# Patient Record
Sex: FEMALE | Race: WHITE | NOT HISPANIC OR LATINO | Employment: FULL TIME | URBAN - METROPOLITAN AREA
[De-identification: names, ages, dates, MRNs, and addresses within clinical notes are randomized per-mention and may not be internally consistent; named-entity substitution may affect disease eponyms.]

---

## 2017-03-20 ENCOUNTER — ALLSCRIPTS OFFICE VISIT (OUTPATIENT)
Dept: OTHER | Facility: OTHER | Age: 37
End: 2017-03-20

## 2017-03-20 DIAGNOSIS — N92.0 EXCESSIVE AND FREQUENT MENSTRUATION WITH REGULAR CYCLE: ICD-10-CM

## 2017-03-23 LAB
HPV 18 (HISTORICAL): NOT DETECTED
HPV HIGH RISK 16/18 (HISTORICAL): NOT DETECTED
HPV16 (HISTORICAL): NOT DETECTED
PAP (HISTORICAL): NORMAL

## 2017-03-24 ENCOUNTER — HOSPITAL ENCOUNTER (OUTPATIENT)
Dept: RADIOLOGY | Facility: HOSPITAL | Age: 37
Discharge: HOME | End: 2017-03-24
Attending: OBSTETRICS & GYNECOLOGY
Payer: COMMERCIAL

## 2017-03-24 DIAGNOSIS — N92.0 EXCESSIVE AND FREQUENT MENSTRUATION WITH REGULAR CYCLE: ICD-10-CM

## 2017-03-24 PROCEDURE — 76830 TRANSVAGINAL US NON-OB: CPT

## 2017-03-24 PROCEDURE — 76856 US EXAM PELVIC COMPLETE: CPT

## 2017-03-26 LAB — ENDOMETRIAL BIOPSY (HISTORICAL): NORMAL

## 2017-04-03 ENCOUNTER — ALLSCRIPTS OFFICE VISIT (OUTPATIENT)
Dept: OTHER | Facility: OTHER | Age: 37
End: 2017-04-03

## 2017-04-07 ENCOUNTER — LAB CONVERSION - ENCOUNTER (OUTPATIENT)
Dept: OTHER | Facility: OTHER | Age: 37
End: 2017-04-07

## 2017-04-07 LAB — ENDOMETRIAL BIOPSY (HISTORICAL): NORMAL

## 2017-05-26 ENCOUNTER — ALLSCRIPTS OFFICE VISIT (OUTPATIENT)
Dept: OTHER | Facility: OTHER | Age: 37
End: 2017-05-26

## 2017-05-26 DIAGNOSIS — N64.9 DISORDER OF BREAST: ICD-10-CM

## 2017-05-26 DIAGNOSIS — N63.0 BREAST LUMP: ICD-10-CM

## 2017-05-30 ENCOUNTER — ALLSCRIPTS OFFICE VISIT (OUTPATIENT)
Dept: OTHER | Facility: OTHER | Age: 37
End: 2017-05-30

## 2017-06-01 ENCOUNTER — HOSPITAL ENCOUNTER (OUTPATIENT)
Dept: MAMMOGRAPHY | Facility: CLINIC | Age: 37
Discharge: HOME | End: 2017-06-01
Payer: COMMERCIAL

## 2017-06-01 ENCOUNTER — HOSPITAL ENCOUNTER (OUTPATIENT)
Dept: ULTRASOUND IMAGING | Facility: CLINIC | Age: 37
Discharge: HOME | End: 2017-06-01
Payer: COMMERCIAL

## 2017-06-01 DIAGNOSIS — N64.9 DISORDER OF BREAST: ICD-10-CM

## 2017-06-01 DIAGNOSIS — N63.0 BREAST LUMP: ICD-10-CM

## 2017-06-01 PROCEDURE — G0204 DX MAMMO INCL CAD BI: HCPCS

## 2017-06-01 PROCEDURE — 76642 ULTRASOUND BREAST LIMITED: CPT

## 2017-06-02 ENCOUNTER — GENERIC CONVERSION - ENCOUNTER (OUTPATIENT)
Dept: OTHER | Facility: OTHER | Age: 37
End: 2017-06-02

## 2018-01-12 NOTE — MISCELLANEOUS
Message   Recorded as Task   Date: 05/26/2017 12:05 PM, Created By: Torsten Scott   Task Name: Care Coordination   Assigned To: Ruy Valdez   Regarding Patient: Oc Lucero, Status: Active   Comment:    JaimeTammie - 26 May 2017 12:05 PM     TASK CREATED  apt sched  05/30 with anaid for breast check    found lump        Active Problems    1  Breast disorder (611 9) (N64 9)   2  Dysmenorrhea (625 3) (N94 6)   3  Encounter for gynecological examination without abnormal finding (V72 31) (Z01 419)   4  Encounter for routine gynecological examination (V72 31) (Z01 419)   5  Encounter for screening for human papillomavirus (hpv) (V73 81) (Z11 51)   6  Menorrhagia (626 2) (N92 0)    Current Meds   1  TraZODone HCl - 100 MG Oral Tablet; Therapy: (Recorded:17Nov2014) to Recorded   2  Venlafaxine HCl - 75 MG Oral Tablet; Therapy: (Recorded:17Nov2014) to Recorded   3  Xanax TABS (ALPRAZolam); Therapy: (Recorded:09Mar2016) to Recorded   4  Zonisamide 100 MG Oral Capsule; Therapy: (Recorded:17Nov2014) to Recorded    Allergies    1  Augmentin TABS   2  Dilantin CAPS   3  Sulfa Drugs   4   TEGretol TABS    Signatures   Electronically signed by : Tomasa Stephenson, ; May 26 2017 12:05PM EST                       (Author)

## 2018-01-12 NOTE — MISCELLANEOUS
Message   Recorded as Task   Date: 06/02/2017 12:04 PM, Created By: Joan Cunha   Task Name: Result Follow Up   Assigned To: Edi Diane GYN,Team   Regarding Patient: Bella Preston, Status: In Progress   CommentRaquel Caballero - 02 Jun 2017 12:04 PM     TASK CREATED  Normal findings on diagnostic imaging  Please notify patient and advise that she schedules follow up exam with me in about 2 months for recheck if the area of concern    Thanks   Marycruz West - 02 Jun 2017 12:41 PM     TASK IN [address] - 02 Jun 2017 12:51 PM     TASK EDITED  Pt notified with normal diagnostic findings  She will need a f/u breast check in 2 months which she was unable to schedule at this time  She will call back to schedule  Active Problems    1  Breast disorder (611 9) (N64 9)   2  Breast mass, right (611 72) (N63)   3  Dysmenorrhea (625 3) (N94 6)   4  Menorrhagia (626 2) (N92 0)   5  Skin lesion of breast (611 9) (N64 9)    Current Meds   1  Clotrimazole 1 % External Cream; APPLY SPARINGLY TO AFFECTED AREA(S) TWICE   DAILY; Therapy: [id number] to (Evaluate:23Jun2017)  Requested for: [id number]; Last   Rx:26May2017 Ordered   2  TraZODone HCl - 100 MG Oral Tablet; Therapy: (Recorded:17Nov2014) to Recorded   3  Venlafaxine HCl - 75 MG Oral Tablet; Therapy: (Recorded:17Nov2014) to Recorded   4  Xanax TABS (ALPRAZolam); Therapy: (Recorded:09Mar2016) to Recorded   5  Zonisamide 100 MG Oral Capsule; Therapy: (Recorded:17Nov2014) to Recorded    Allergies    1  Augmentin TABS   2  Dilantin CAPS   3  Sulfa Drugs   4   TEGretol TABS    Signatures   Electronically signed by : Rosalie Anderson, ; Jun 2 2017 12:52PM EST                       (Author)

## 2018-01-13 VITALS
DIASTOLIC BLOOD PRESSURE: 80 MMHG | WEIGHT: 176 LBS | BODY MASS INDEX: 30.05 KG/M2 | HEIGHT: 64 IN | SYSTOLIC BLOOD PRESSURE: 132 MMHG

## 2018-01-14 VITALS
DIASTOLIC BLOOD PRESSURE: 80 MMHG | WEIGHT: 175 LBS | SYSTOLIC BLOOD PRESSURE: 122 MMHG | HEIGHT: 64 IN | BODY MASS INDEX: 29.88 KG/M2

## 2018-04-08 PROBLEM — L98.8 SKIN LESION OF BREAST: Status: ACTIVE | Noted: 2017-05-26

## 2018-04-08 PROBLEM — N63.10 BREAST MASS, RIGHT: Status: ACTIVE | Noted: 2017-05-26

## 2020-09-15 ENCOUNTER — APPOINTMENT (EMERGENCY)
Dept: RADIOLOGY | Facility: HOSPITAL | Age: 40
End: 2020-09-15
Payer: COMMERCIAL

## 2020-09-15 ENCOUNTER — HOSPITAL ENCOUNTER (OUTPATIENT)
Facility: HOSPITAL | Age: 40
Setting detail: OBSERVATION
Discharge: HOME | End: 2020-09-16
Attending: EMERGENCY MEDICINE | Admitting: INTERNAL MEDICINE
Payer: COMMERCIAL

## 2020-09-15 DIAGNOSIS — R10.12 LEFT UPPER QUADRANT ABDOMINAL PAIN: ICD-10-CM

## 2020-09-15 DIAGNOSIS — D73.4 SPLENIC CYST: Primary | ICD-10-CM

## 2020-09-15 LAB
ALBUMIN SERPL BCP-MCNC: 3.9 G/DL (ref 3.5–5)
ALP SERPL-CCNC: 92 U/L (ref 46–116)
ALT SERPL W P-5'-P-CCNC: 18 U/L (ref 12–78)
ANION GAP SERPL CALCULATED.3IONS-SCNC: 6 MMOL/L (ref 4–13)
AST SERPL W P-5'-P-CCNC: 7 U/L (ref 5–45)
BASOPHILS # BLD AUTO: 0.05 THOUSANDS/ΜL (ref 0–0.1)
BASOPHILS NFR BLD AUTO: 0 % (ref 0–1)
BILIRUB SERPL-MCNC: 0.28 MG/DL (ref 0.2–1)
BILIRUB UR QL STRIP: NEGATIVE
BUN SERPL-MCNC: 11 MG/DL (ref 5–25)
CALCIUM SERPL-MCNC: 9 MG/DL (ref 8.3–10.1)
CHLORIDE SERPL-SCNC: 107 MMOL/L (ref 100–108)
CLARITY UR: CLEAR
CO2 SERPL-SCNC: 24 MMOL/L (ref 21–32)
COLOR UR: YELLOW
COLOR, POC: YELLOW
CREAT SERPL-MCNC: 0.75 MG/DL (ref 0.6–1.3)
EOSINOPHIL # BLD AUTO: 0.17 THOUSAND/ΜL (ref 0–0.61)
EOSINOPHIL NFR BLD AUTO: 1 % (ref 0–6)
ERYTHROCYTE [DISTWIDTH] IN BLOOD BY AUTOMATED COUNT: 12.9 % (ref 11.6–15.1)
EXT PREG TEST URINE: NEGATIVE
EXT. CONTROL ED NAV: NORMAL
GFR SERPL CREATININE-BSD FRML MDRD: 99 ML/MIN/1.73SQ M
GLUCOSE SERPL-MCNC: 94 MG/DL (ref 65–140)
GLUCOSE UR STRIP-MCNC: NEGATIVE MG/DL
HCT VFR BLD AUTO: 43.3 % (ref 34.8–46.1)
HGB BLD-MCNC: 14.4 G/DL (ref 11.5–15.4)
HGB UR QL STRIP.AUTO: NEGATIVE
IMM GRANULOCYTES # BLD AUTO: 0.08 THOUSAND/UL (ref 0–0.2)
IMM GRANULOCYTES NFR BLD AUTO: 1 % (ref 0–2)
KETONES UR STRIP-MCNC: NEGATIVE MG/DL
LEUKOCYTE ESTERASE UR QL STRIP: NEGATIVE
LIPASE SERPL-CCNC: 64 U/L (ref 73–393)
LYMPHOCYTES # BLD AUTO: 2.99 THOUSANDS/ΜL (ref 0.6–4.47)
LYMPHOCYTES NFR BLD AUTO: 20 % (ref 14–44)
MCH RBC QN AUTO: 31 PG (ref 26.8–34.3)
MCHC RBC AUTO-ENTMCNC: 33.3 G/DL (ref 31.4–37.4)
MCV RBC AUTO: 93 FL (ref 82–98)
MONOCYTES # BLD AUTO: 1 THOUSAND/ΜL (ref 0.17–1.22)
MONOCYTES NFR BLD AUTO: 7 % (ref 4–12)
NEUTROPHILS # BLD AUTO: 10.62 THOUSANDS/ΜL (ref 1.85–7.62)
NEUTS SEG NFR BLD AUTO: 71 % (ref 43–75)
NITRITE UR QL STRIP: NEGATIVE
NRBC BLD AUTO-RTO: 0 /100 WBCS
PH UR STRIP.AUTO: 7.5 [PH] (ref 4.5–8)
PLATELET # BLD AUTO: 273 THOUSANDS/UL (ref 149–390)
PMV BLD AUTO: 9.4 FL (ref 8.9–12.7)
POTASSIUM SERPL-SCNC: 3.5 MMOL/L (ref 3.5–5.3)
PROT SERPL-MCNC: 7.1 G/DL (ref 6.4–8.2)
PROT UR STRIP-MCNC: NEGATIVE MG/DL
RBC # BLD AUTO: 4.65 MILLION/UL (ref 3.81–5.12)
SODIUM SERPL-SCNC: 137 MMOL/L (ref 136–145)
SP GR UR STRIP.AUTO: 1.01 (ref 1–1.03)
TROPONIN I SERPL-MCNC: <0.02 NG/ML
UROBILINOGEN UR QL STRIP.AUTO: 0.2 E.U./DL
WBC # BLD AUTO: 14.91 THOUSAND/UL (ref 4.31–10.16)

## 2020-09-15 PROCEDURE — 80053 COMPREHEN METABOLIC PANEL: CPT | Performed by: EMERGENCY MEDICINE

## 2020-09-15 PROCEDURE — 81025 URINE PREGNANCY TEST: CPT | Performed by: EMERGENCY MEDICINE

## 2020-09-15 PROCEDURE — 71275 CT ANGIOGRAPHY CHEST: CPT

## 2020-09-15 PROCEDURE — 99285 EMERGENCY DEPT VISIT HI MDM: CPT | Performed by: EMERGENCY MEDICINE

## 2020-09-15 PROCEDURE — 36415 COLL VENOUS BLD VENIPUNCTURE: CPT | Performed by: EMERGENCY MEDICINE

## 2020-09-15 PROCEDURE — 74174 CTA ABD&PLVS W/CONTRAST: CPT

## 2020-09-15 PROCEDURE — G1004 CDSM NDSC: HCPCS

## 2020-09-15 PROCEDURE — 99285 EMERGENCY DEPT VISIT HI MDM: CPT

## 2020-09-15 PROCEDURE — 93005 ELECTROCARDIOGRAM TRACING: CPT

## 2020-09-15 PROCEDURE — 96376 TX/PRO/DX INJ SAME DRUG ADON: CPT

## 2020-09-15 PROCEDURE — 84484 ASSAY OF TROPONIN QUANT: CPT | Performed by: EMERGENCY MEDICINE

## 2020-09-15 PROCEDURE — 83690 ASSAY OF LIPASE: CPT | Performed by: EMERGENCY MEDICINE

## 2020-09-15 PROCEDURE — 81003 URINALYSIS AUTO W/O SCOPE: CPT

## 2020-09-15 PROCEDURE — 96375 TX/PRO/DX INJ NEW DRUG ADDON: CPT

## 2020-09-15 PROCEDURE — 85025 COMPLETE CBC W/AUTO DIFF WBC: CPT | Performed by: EMERGENCY MEDICINE

## 2020-09-15 PROCEDURE — 96374 THER/PROPH/DIAG INJ IV PUSH: CPT

## 2020-09-15 RX ORDER — ZONISAMIDE 100 MG/1
100 CAPSULE ORAL 2 TIMES DAILY
COMMUNITY
Start: 2020-09-08

## 2020-09-15 RX ORDER — FENTANYL CITRATE 50 UG/ML
50 INJECTION, SOLUTION INTRAMUSCULAR; INTRAVENOUS ONCE
Status: COMPLETED | OUTPATIENT
Start: 2020-09-15 | End: 2020-09-15

## 2020-09-15 RX ORDER — ACETAMINOPHEN 325 MG/1
975 TABLET ORAL ONCE
Status: COMPLETED | OUTPATIENT
Start: 2020-09-15 | End: 2020-09-15

## 2020-09-15 RX ORDER — VENLAFAXINE HYDROCHLORIDE 37.5 MG/1
187.5 CAPSULE, EXTENDED RELEASE ORAL DAILY
COMMUNITY
Start: 2020-08-06

## 2020-09-15 RX ORDER — KETOROLAC TROMETHAMINE 30 MG/ML
15 INJECTION, SOLUTION INTRAMUSCULAR; INTRAVENOUS ONCE
Status: COMPLETED | OUTPATIENT
Start: 2020-09-15 | End: 2020-09-15

## 2020-09-15 RX ORDER — ONDANSETRON 2 MG/ML
4 INJECTION INTRAMUSCULAR; INTRAVENOUS ONCE
Status: COMPLETED | OUTPATIENT
Start: 2020-09-15 | End: 2020-09-15

## 2020-09-15 RX ORDER — QUETIAPINE FUMARATE 100 MG/1
50 TABLET, FILM COATED ORAL
COMMUNITY
Start: 2020-08-06 | End: 2021-08-06

## 2020-09-15 RX ADMIN — KETOROLAC TROMETHAMINE 15 MG: 30 INJECTION, SOLUTION INTRAMUSCULAR at 20:42

## 2020-09-15 RX ADMIN — ACETAMINOPHEN 975 MG: 325 TABLET, FILM COATED ORAL at 20:34

## 2020-09-15 RX ADMIN — FENTANYL CITRATE 50 MCG: 50 INJECTION INTRAMUSCULAR; INTRAVENOUS at 21:01

## 2020-09-15 RX ADMIN — IOHEXOL 100 ML: 350 INJECTION, SOLUTION INTRAVENOUS at 23:08

## 2020-09-15 RX ADMIN — FENTANYL CITRATE 50 MCG: 50 INJECTION, SOLUTION INTRAMUSCULAR; INTRAVENOUS at 23:28

## 2020-09-15 RX ADMIN — ONDANSETRON 4 MG: 2 INJECTION INTRAMUSCULAR; INTRAVENOUS at 20:36

## 2020-09-15 NOTE — ED PROVIDER NOTES
History  Chief Complaint   Patient presents with    Abdominal Pain     left sided pain under ribs that radiates to mid abdomen, lower back, and up into left side of neck beginning yesterday  pt took advil and gasex with little relief     41 YOF with history of depression, anxiety, seizures who presents for abdominal and chest pain  Patient reports her symptoms started yesterday morning with LUQ and L flank pain  Last night, she noted her pain began to radiate to the L shoulder and upper back  Her pain is pleuritic and worse with movement  Just prior to arrival, she began to feel nauseous and "clammy " She has never had anything like this before  Her symptoms have not been progressively worsening, however, they have not improved at all prompting her to seek evaluation  She denies vomiting, diarrhea, dysuria, SOB, cough, fevers  She has not had any abdominal surgeries  She denies surgeries in the past 6 months, recent travel, estrogen-containing medications, history of DVT/PE  Prior to Admission Medications   Prescriptions Last Dose Informant Patient Reported? Taking? QUEtiapine (SEROquel) 100 mg tablet   Yes Yes   Sig: Take 50 mg by mouth   venlafaxine (EFFEXOR-XR) 37 5 mg 24 hr capsule   Yes Yes   Sig: Take 187 5 mg by mouth daily   zonisamide (ZONEGRAN) 100 mg capsule   Yes Yes   Sig: Take 100 mg by mouth 2 (two) times a day      Facility-Administered Medications: None       Past Medical History:   Diagnosis Date    AVM (arteriovenous malformation)     Candidal vulvovaginitis     Depression     Headache     Normal delivery     2003 daughter, 2007 son    Ovarian cyst     RESOLVED: [id number]    Pelvic pain     Seizure ([address]     Seizures ([facility] [address] )        Past Surgical History:   Procedure Laterality Date    BREAST SURGERY      ENLARGEMENT    COMBINED AUGMENTATION MAMMAPLASTY AND ABDOMINOPLASTY      with expander    CRANIOTOMY      right frontal lobe in 1999;  A-V MALFORMATION REPAIR    ENDOMETRIAL ABLATION      DONNIE; RESOLVED: [id number]    ENDOMETRIAL BIOPSY  03/20/2017    EB; WITHOUT CERVICAL DILATION    HYSTEROSCOPY W/ ENDOMETRIAL ABLATION      LAMINECTOMY      L4-L5; LUMBAR       Family History   Problem Relation Age of Onset    Arthritis Mother     Carpal tunnel syndrome Mother     Obesity Mother     Hyperlipidemia Father     Hypertension Father      I have reviewed and agree with the history as documented  E-Cigarette/Vaping     E-Cigarette/Vaping Substances     Social History     Tobacco Use    Smoking status: Current Every Day Smoker     Packs/day: 0 50    Smokeless tobacco: Never Used   Substance Use Topics    Alcohol use: Yes     Comment: DRINKS BEER AS PER ALL SCRIPTS ; SOCIAL     Drug use: Yes     Types: Marijuana     Comment: medical        Review of Systems   Constitutional: Positive for diaphoresis  Negative for chills and fever  HENT: Negative for congestion, rhinorrhea and sore throat  Eyes: Negative for visual disturbance  Respiratory: Negative for cough, chest tightness and shortness of breath  Cardiovascular: Positive for chest pain  Negative for leg swelling  Gastrointestinal: Positive for abdominal pain and nausea  Negative for abdominal distention, blood in stool, constipation, diarrhea and vomiting  Genitourinary: Positive for flank pain  Negative for dysuria, frequency, hematuria and urgency  Musculoskeletal: Negative for back pain and gait problem  Skin: Negative for pallor and rash  Neurological: Negative for syncope, weakness, light-headedness and headaches  All other systems reviewed and are negative        Physical Exam  ED Triage Vitals [09/15/20 1856]   Temperature Pulse Respirations Blood Pressure SpO2   98 1 °F (36 7 °C) 72 18 136/84 97 %      Temp Source Heart Rate Source Patient Position - Orthostatic VS BP Location FiO2 (%)   Oral Monitor Sitting Left arm --      Pain Score       7             Orthostatic Vital Signs  Vitals: 09/16/20 0845 09/16/20 0900 09/16/20 1000 09/16/20 1032   BP: 136/63 118/60 117/58 134/60   Pulse: 76 64 56 62   Patient Position - Orthostatic VS: Lying   Lying       Physical Exam  Vitals signs and nursing note reviewed  Constitutional:       General: She is not in acute distress  Appearance: She is well-developed  HENT:      Head: Normocephalic and atraumatic  Nose: Nose normal       Mouth/Throat:      Mouth: Mucous membranes are moist    Eyes:      General: No scleral icterus  Extraocular Movements: Extraocular movements intact  Conjunctiva/sclera: Conjunctivae normal       Pupils: Pupils are equal, round, and reactive to light  Neck:      Musculoskeletal: Normal range of motion and neck supple  Cardiovascular:      Rate and Rhythm: Normal rate and regular rhythm  Heart sounds: No murmur  No friction rub  No gallop  Pulmonary:      Effort: Pulmonary effort is normal       Breath sounds: Normal breath sounds  No wheezing or rales  Chest:      Chest wall: Tenderness (left upper) present  Abdominal:      General: Bowel sounds are normal  There is no distension  Palpations: Abdomen is soft  Tenderness: There is abdominal tenderness in the epigastric area and left upper quadrant  There is no right CVA tenderness or left CVA tenderness  Musculoskeletal: Normal range of motion  General: No swelling or tenderness  Comments: No calf tenderness or lower extremity edema  Skin:     General: Skin is warm and dry  Coloration: Skin is not pale  Findings: No rash  Neurological:      Mental Status: She is alert and oriented to person, place, and time     Psychiatric:         Behavior: Behavior normal          ED Medications  Medications   ondansetron (ZOFRAN) injection 4 mg (4 mg Intravenous Given 9/15/20 2036)   ketorolac (TORADOL) injection 15 mg (15 mg Intravenous Given 9/15/20 2042)   acetaminophen (TYLENOL) tablet 975 mg (975 mg Oral Given 9/15/20 2034)   fentanyl citrate (PF) 100 MCG/2ML 50 mcg (50 mcg Intravenous Given 9/15/20 2101)   fentanyl citrate (PF) 100 MCG/2ML 50 mcg (50 mcg Intravenous Given 9/15/20 2328)   iohexol (OMNIPAQUE) 350 MG/ML injection (MULTI-DOSE) 100 mL (100 mL Intravenous Given 9/15/20 2308)   HYDROmorphone (DILAUDID) injection 0 5 mg (0 5 mg Intravenous Given 9/16/20 0115)   HYDROmorphone (DILAUDID) injection 0 5 mg (0 5 mg Intravenous Given 9/16/20 0431)       Diagnostic Studies  Results Reviewed     Procedure Component Value Units Date/Time    Troponin I [[id number]] Collected:  09/16/20 0839    Lab Status:  Final result Specimen:  Blood from Arm, Left Updated:  09/16/20 0924     Troponin I <0 02 ng/mL     Troponin I [[id number]] Collected:  09/16/20 3982    Lab Status:  Final result Specimen:  Blood from Arm, Left Updated:  09/16/20 0708     Troponin I <0 02 ng/mL     Comprehensive metabolic panel [[id number]] Collected:  09/15/20 2030    Lab Status:  Final result Specimen:  Blood from Arm, Left Updated:  09/15/20 2107     Sodium 137 mmol/L      Potassium 3 5 mmol/L      Chloride 107 mmol/L      CO2 24 mmol/L      ANION GAP 6 mmol/L      BUN 11 mg/dL      Creatinine 0 75 mg/dL      Glucose 94 mg/dL      Calcium 9 0 mg/dL      AST 7 U/L      ALT 18 U/L      Alkaline Phosphatase 92 U/L      Total Protein 7 1 g/dL      Albumin 3 9 g/dL      Total Bilirubin 0 28 mg/dL      eGFR 99 ml/min/1 73sq m     Narrative:       Frank guidelines for Chronic Kidney Disease (CKD):     Stage 1 with normal or high GFR (GFR > 90 mL/min/1 73 square meters)    Stage 2 Mild CKD (GFR = 60-89 mL/min/1 73 square meters)    Stage 3A Moderate CKD (GFR = 45-59 mL/min/1 73 square meters)    Stage 3B Moderate CKD (GFR = 30-44 mL/min/1 73 square meters)    Stage 4 Severe CKD (GFR = 15-29 mL/min/1 73 square meters)    Stage 5 End Stage CKD (GFR <15 mL/min/1 73 square meters)  Note: GFR calculation is accurate only with a steady state creatinine    Lipase [[id number]]  (Abnormal) Collected:  09/15/20 2030    Lab Status:  Final result Specimen:  Blood from Arm, Left Updated:  09/15/20 2107     Lipase 64 u/L     Troponin I [[id number]]  (Normal) Collected:  09/15/20 2030    Lab Status:  Final result Specimen:  Blood from Arm, Left Updated:  09/15/20 2103     Troponin I <0 02 ng/mL     POCT pregnancy, urine [[id number]]  (Normal) Resulted:  09/15/20 2040    Lab Status:  Final result Updated:  09/15/20 2044     EXT PREG TEST UR (Ref: Negative) negative     Control valid    POCT urinalysis dipstick [[id number]]  (Normal) Resulted:  09/15/20 2043    Lab Status:  Final result Specimen:  Urine Updated:  09/15/20 2044     Color, UA yellow    CBC and differential [[id number]]  (Abnormal) Collected:  09/15/20 2030    Lab Status:  Final result Specimen:  Blood from Arm, Left Updated:  09/15/20 2042     WBC 14 91 Thousand/uL      RBC 4 65 Million/uL      Hemoglobin 14 4 g/dL      Hematocrit 43 3 %      MCV 93 fL      MCH 31 0 pg      MCHC 33 3 g/dL      RDW 12 9 %      MPV 9 4 fL      Platelets 329 Thousands/uL      nRBC 0 /100 WBCs      Neutrophils Relative 71 %      Immat GRANS % 1 %      Lymphocytes Relative 20 %      Monocytes Relative 7 %      Eosinophils Relative 1 %      Basophils Relative 0 %      Neutrophils Absolute 10 62 Thousands/µL      Immature Grans Absolute 0 08 Thousand/uL      Lymphocytes Absolute 2 99 Thousands/µL      Monocytes Absolute 1 00 Thousand/µL      Eosinophils Absolute 0 17 Thousand/µL      Basophils Absolute 0 05 Thousands/µL     Urine Macroscopic, POC [[id number]] Collected:  09/15/20 2039    Lab Status:  Final result Specimen:  Urine Updated:  09/15/20 2041     Color, UA Yellow     Clarity, UA Clear     pH, UA 7 5     Leukocytes, UA Negative     Nitrite, UA Negative     Protein, UA Negative mg/dl      Glucose, UA Negative mg/dl      Ketones, UA Negative mg/dl      Urobilinogen, UA 0 2 E U /dl      Bilirubin, UA Negative     Blood, UA Negative     Specific [place], UA 1 015    Narrative:       CLINITEK RESULT                 CTA dissection protocol chest abdomen pelvis w wo contrast   Final Result by Ellis Lugo MD (09/16 0003)      1  No aortic dissection or aneurysm  2   5 4 cm simple appearing cyst in the spleen           Workstation performed: [id number]               Procedures  ECG 12 Lead Documentation Only    Date/Time: 9/15/2020 8:52 PM  Performed by: David Fernandez MD  Authorized by: David Fenrandez MD     Indications / Diagnosis:  Chest pain  ECG reviewed by me, the ED Provider: yes    Patient location:  ED  Previous ECG:     Previous ECG:  Unavailable  Interpretation:     Interpretation: normal    Rate:     ECG rate:  65    ECG rate assessment: normal    Rhythm:     Rhythm: sinus rhythm    Ectopy:     Ectopy: none    QRS:     QRS axis:  Normal    QRS intervals:  Normal  Conduction:     Conduction: normal    ST segments:     ST segments:  Normal  T waves:     T waves: inverted      Inverted:  V1 and aVR          ED Course  ED Course as of Sep 17 0900   Tue Sep 15, 2020   2044 PREGNANCY TEST URINE: negative   2044 CBC and differential(!)   2044 Urine Macroscopic, POC   2103 Troponin I: <0 02   2107 Lipase(!): 64   2107 Comprehensive metabolic panel           HEART Risk Score      Most Recent Value   Heart Score Risk Calculator   History  0 Filed at: 09/15/2020 2120   ECG  0 Filed at: 09/15/2020 2120   Age  0 Filed at: 09/15/2020 2120   Risk Factors  0 Filed at: 09/15/2020 2120   Troponin  0 Filed at: 09/15/2020 2120   HEART Score  0 Filed at: 09/15/2020 2120              PERC Rule for PE      Most Recent Value   PERC Rule for PE   Age >=50  0 Filed at: 09/15/2020 2049   HR >=100  0 Filed at: 09/15/2020 2049   O2 Sat on room air < 95%  0 Filed at: 09/15/2020 2049   History of PE or DVT  0 Filed at: 09/15/2020 2049   Recent trauma or surgery  0 Filed at: 09/15/2020 2049   Hemoptysis  0 Filed at: 09/15/2020 2049   Exogenous estrogen 0 Filed at: 09/15/2020 2049   Unilateral leg swelling  0 Filed at: 09/15/2020 2049   PERC Rule for PE Results  0 Filed at: 09/15/2020 2049          Initial Sepsis Screening     Row Name 09/16/20 0730                Is the patient's history suggestive of a new or worsening infection? No  -EP        Suspected source of infection          Are two or more of the following signs & symptoms of infection both present and new to the patient? No  -EP        Indicate SIRS criteria          If the answer is yes to both questions, suspicion of sepsis is present          If severe sepsis is present AND tissue hypoperfusion perists in the hour after fluid resuscitation or lactate > 4, the patient meets criteria for SEPTIC SHOCK          Are any of the following organ dysfunction criteria present within 6 hours of suspected infection and SIRS criteria that are NOT considered to be chronic conditions?         Organ dysfunction          Date of presentation of severe sepsis          Time of presentation of severe sepsis          Tissue hypoperfusion persists in the hour after crystalloid fluid administration, evidenced, by either:          Was hypotension present within one hour of the conclusion of crystalloid fluid administration?           Date of presentation of septic shock          Time of presentation of septic shock            User Key  (r) = Recorded By, (t) = Taken By, (c) = Cosigned By    [address] Name Provider Delphine Saldivar MD Physician              Daisy Morales' Criteria for PE      Most Recent Value   David' Criteria for PE   Clinical signs and symptoms of DVT  0 Filed at: 09/15/2020 2049   PE is primary diagnosis or equally likely  0 Filed at: 09/15/2020 2049   HR >100  0 Filed at: 09/15/2020 2049   Immobilization at least 3 days or Surgery in the previous 4 weeks  0 Filed at: 09/15/2020 2049   Previous, objectively diagnosed PE or DVT  0 Filed at: 09/15/2020 2049   Hemoptysis  0 Filed at: 09/15/2020 2049   Malignancy with treatment within 6 months or palliative  0 Filed at: 09/15/2020 2049   Audrey Neilk' Criteria Total  0 Filed at: 09/15/2020 2049              [facility]  Number of Diagnoses or Management Options  Left upper quadrant abdominal pain: new and requires workup  Splenic cyst: new and requires workup  Diagnosis management comments: 41 YOF with LUQ and L flank pain  Differential diagnoses include but not limited to ACS, musculoskeletal, PE, aortic dissection, splenic mass  Plan to obtain dissection study with CTAP, cardiac workup  PERC negative for PE  Labs WNL apart from mild leukocytosis  CT showing no dissection, splenic cyst abutting the L diaphragm which is likely causing her symptoms  Will discuss with white surgery as patient is symptomatic  Signed out to Dr Isabela Greenfield awaiting evaluation by white surgery  Patient admitted for observation         Amount and/or Complexity of Data Reviewed  Clinical lab tests: ordered and reviewed  Tests in the radiology section of CPT®: ordered and reviewed  Decide to obtain previous medical records or to obtain history from someone other than the patient: yes  Review and summarize past medical records: yes  Discuss the patient with other providers: yes    Risk of Complications, Morbidity, and/or Mortality  Presenting problems: high  Diagnostic procedures: low  Management options: moderate    Patient Progress  Patient progress: stable        Disposition  Final diagnoses:   Splenic cyst   Left upper quadrant abdominal pain     Time reflects when diagnosis was documented in both MDM as applicable and the Disposition within this note     Time User Action Codes Description Comment    9/16/2020  1:36 AM Clute Areas Add [D73 4] Splenic cyst     9/16/2020  2:36 AM Keegan Math Add [R10 12] Left upper quadrant abdominal pain     9/16/2020  2:36 AM Keegan Math Modify [D73 4] Splenic cyst       ED Disposition     ED Disposition Condition Date/Time Comment Discharge  Wed Sep 16, 2020 10:43 AM Case was discussed with Dr Conner Smith and the patient's admission status was agreed to be Admission Status: observation status to the service of Dr Conner Smith   Follow-up Information     Follow up With Specialties Details Why Contact Jessica Israel, DO Family Medicine Follow up Please schedule follow up appointment with PCP as soon as possible [address] [address]  [phone]            Discharge Medication List as of 9/16/2020 10:28 AM      START taking these medications    Details   HYDROcodone-acetaminophen (NORCO) 5-325 mg per tablet Take 2 tablets by mouth every 6 (six) hours as needed for pain for up to 10 days Take 1-2 tablets by mouth every 6 hours as needed for moderate-severe painMax Daily Amount: 8 tablets, Starting Wed 9/16/2020, Until Sat 9/26/2020, Normal         CONTINUE these medications which have NOT CHANGED    Details   QUEtiapine (SEROquel) 100 mg tablet Take 50 mg by mouth, Starting Thu 8/6/2020, Until Fri 8/6/2021, Historical Med      venlafaxine (EFFEXOR-XR) 37 5 mg 24 hr capsule Take 187 5 mg by mouth daily, Starting Thu 8/6/2020, Historical Med      zonisamide (ZONEGRAN) 100 mg capsule Take 100 mg by mouth 2 (two) times a day, Starting Tue 9/8/2020, Historical Med               PDMP Review     None           ED Provider  Attending physically available and evaluated Za Stevenson  PRECIOUS managed the patient along with the ED Attending      Electronically Signed by         Kelly Laws MD  09/17/20 0900

## 2020-09-16 VITALS
HEIGHT: 62 IN | TEMPERATURE: 98.1 F | HEART RATE: 62 BPM | RESPIRATION RATE: 16 BRPM | SYSTOLIC BLOOD PRESSURE: 134 MMHG | BODY MASS INDEX: 32.2 KG/M2 | WEIGHT: 175 LBS | OXYGEN SATURATION: 100 % | DIASTOLIC BLOOD PRESSURE: 60 MMHG

## 2020-09-16 PROBLEM — D73.4 SPLENIC CYST: Status: ACTIVE | Noted: 2020-09-16

## 2020-09-16 PROBLEM — D72.829 LEUKOCYTOSIS: Status: ACTIVE | Noted: 2020-09-16

## 2020-09-16 PROBLEM — R10.9 ABDOMINAL PAIN: Status: ACTIVE | Noted: 2020-09-16

## 2020-09-16 LAB
ATRIAL RATE: 65 BPM
ATRIAL RATE: 68 BPM
P AXIS: 64 DEGREES
P AXIS: 65 DEGREES
PR INTERVAL: 188 MS
PR INTERVAL: 194 MS
QRS AXIS: 88 DEGREES
QRS AXIS: 90 DEGREES
QRSD INTERVAL: 80 MS
QRSD INTERVAL: 80 MS
QT INTERVAL: 402 MS
QT INTERVAL: 408 MS
QTC INTERVAL: 418 MS
QTC INTERVAL: 433 MS
T WAVE AXIS: 48 DEGREES
T WAVE AXIS: 49 DEGREES
TROPONIN I SERPL-MCNC: <0.02 NG/ML
TROPONIN I SERPL-MCNC: <0.02 NG/ML
VENTRICULAR RATE: 65 BPM
VENTRICULAR RATE: 68 BPM

## 2020-09-16 PROCEDURE — 84484 ASSAY OF TROPONIN QUANT: CPT | Performed by: INTERNAL MEDICINE

## 2020-09-16 PROCEDURE — 36415 COLL VENOUS BLD VENIPUNCTURE: CPT | Performed by: INTERNAL MEDICINE

## 2020-09-16 PROCEDURE — 99220 PR INITIAL OBSERVATION CARE/DAY 70 MINUTES: CPT | Performed by: INTERNAL MEDICINE

## 2020-09-16 PROCEDURE — 96375 TX/PRO/DX INJ NEW DRUG ADDON: CPT

## 2020-09-16 PROCEDURE — 93010 ELECTROCARDIOGRAM REPORT: CPT | Performed by: INTERNAL MEDICINE

## 2020-09-16 PROCEDURE — 99245 OFF/OP CONSLTJ NEW/EST HI 55: CPT | Performed by: SURGERY

## 2020-09-16 PROCEDURE — NC001 PR NO CHARGE: Performed by: PHYSICIAN ASSISTANT

## 2020-09-16 RX ORDER — OXYCODONE HYDROCHLORIDE AND ACETAMINOPHEN 5; 325 MG/1; MG/1
1 TABLET ORAL EVERY 4 HOURS PRN
Status: DISCONTINUED | OUTPATIENT
Start: 2020-09-16 | End: 2020-09-16

## 2020-09-16 RX ORDER — HYDROCODONE BITARTRATE AND ACETAMINOPHEN 5; 325 MG/1; MG/1
2 TABLET ORAL EVERY 6 HOURS PRN
Status: DISCONTINUED | OUTPATIENT
Start: 2020-09-16 | End: 2020-09-16 | Stop reason: HOSPADM

## 2020-09-16 RX ORDER — HYDROMORPHONE HCL/PF 1 MG/ML
0.5 SYRINGE (ML) INJECTION ONCE
Status: COMPLETED | OUTPATIENT
Start: 2020-09-16 | End: 2020-09-16

## 2020-09-16 RX ORDER — HYDROCODONE BITARTRATE AND ACETAMINOPHEN 5; 325 MG/1; MG/1
2 TABLET ORAL EVERY 6 HOURS PRN
Qty: 30 TABLET | Refills: 0 | Status: SHIPPED | OUTPATIENT
Start: 2020-09-16 | End: 2020-09-26

## 2020-09-16 RX ORDER — HYDROCODONE BITARTRATE AND ACETAMINOPHEN 5; 325 MG/1; MG/1
1 TABLET ORAL EVERY 6 HOURS PRN
Status: DISCONTINUED | OUTPATIENT
Start: 2020-09-16 | End: 2020-09-16 | Stop reason: HOSPADM

## 2020-09-16 RX ORDER — ZONISAMIDE 100 MG/1
100 CAPSULE ORAL 2 TIMES DAILY
Status: DISCONTINUED | OUTPATIENT
Start: 2020-09-16 | End: 2020-09-16 | Stop reason: HOSPADM

## 2020-09-16 RX ORDER — QUETIAPINE FUMARATE 25 MG/1
50 TABLET, FILM COATED ORAL
Status: DISCONTINUED | OUTPATIENT
Start: 2020-09-16 | End: 2020-09-16 | Stop reason: HOSPADM

## 2020-09-16 RX ADMIN — VENLAFAXINE HYDROCHLORIDE 187.5 MG: 37.5 CAPSULE, EXTENDED RELEASE ORAL at 08:40

## 2020-09-16 RX ADMIN — MORPHINE SULFATE 2 MG: 2 INJECTION, SOLUTION INTRAMUSCULAR; INTRAVENOUS at 06:16

## 2020-09-16 RX ADMIN — HYDROCODONE BITARTRATE AND ACETAMINOPHEN 2 TABLET: 5; 325 TABLET ORAL at 09:54

## 2020-09-16 RX ADMIN — HYDROMORPHONE HYDROCHLORIDE 0.5 MG: 1 INJECTION, SOLUTION INTRAMUSCULAR; INTRAVENOUS; SUBCUTANEOUS at 01:15

## 2020-09-16 RX ADMIN — ZONISAMIDE 100 MG: 100 CAPSULE ORAL at 08:40

## 2020-09-16 RX ADMIN — HYDROMORPHONE HYDROCHLORIDE 0.5 MG: 1 INJECTION, SOLUTION INTRAMUSCULAR; INTRAVENOUS; SUBCUTANEOUS at 04:31

## 2020-09-16 NOTE — H&P
H&P- Luci Bang 1980, 41 y o  female MRN: [id number]    Unit/Bed#: ED 14 Encounter: [id number]    Primary Care Provider: Alesha Noble DO   Date and time admitted to hospital: 9/15/2020  7:45 PM        Abdominal pain  Assessment & Plan  Left upper quadrant abdominal pain radiating to left shoulder  Placed on telemetry and trend troponin  If no ischemia or arrhythmia or bump in troponin can discontinue tele  Likely secondary to splenic cyst  Management as per [facility] SIRS    Leukocytosis  Assessment & Plan  Unclear etiology  No other sirs criteria  Will not start antibiotics    Splenic cyst  Assessment & Plan  Will need to coordinate with general surgery regarding plan for splenectomy  Can be done inpatient or outpatient  Will depend on symptom control  Patient currently needing IV pain medications  Pain currently quantified as 5/10  The patient risk of infection capsulated bacteria following splenectomy          VTE Prophylaxis: Heparin  / sequential compression device   Code Status: Full code  POLST: There is no POLST form on file for this patient (pre-hospital)  Discussion with family: Discussed with patient and she will update family  Anticipated Length of Stay:  Patient will be admitted on an Observation basis with an anticipated length of stay of  Less than 2 midnights  Justification for Hospital Stay: abdominal pain  Total Time for Visit, including Counseling / Coordination of Care: 45 minutes  Greater than 50% of this total time spent on direct patient counseling and coordination of care  Chief Complaint:    Left upper quadrant pain    History of Present Illness:    Luci Bang is a 41 y o  female with a background of anxiety and insomnia who presents with who presents with left upper quadrant pain radiating to left shoulder  CT abdomen showed a splenic cyst and [facility] surgery have seen the patient and are considering splenectomy      Overall the patient feels slightly better after IV pain medication in the ED  She has thus far required IV Dilaudid, IV fentanyl and IV morphine for pain control  She did not take her Seroquel last night but does not want to catch home on this dose now as she is concerned that she will be drowsy during the day  She currently works and is working from home and is eager to be discharged later today after follow-up with [facility] surgery has been established and pain is better controlled  Review of Systems:    Review of Systems   Constitutional: Negative for activity change, appetite change, fatigue and unexpected weight change  HENT: Negative  Respiratory: Negative for apnea and cough  Cardiovascular: Negative for chest pain and palpitations  Gastrointestinal: Positive for abdominal pain  Endocrine: Negative  Genitourinary: Negative  Musculoskeletal: Negative  Neurological: Negative  Hematological: Negative  Psychiatric/Behavioral: The patient is nervous/anxious  Past Medical and Surgical History:     Past Medical History:   Diagnosis Date    AVM (arteriovenous malformation)     Candidal vulvovaginitis     Depression     Headache     Normal delivery     2003 daughter, 2007 son    Ovarian cyst     RESOLVED: [id number]    Pelvic pain     Seizure ([address]     Seizures ([address] )        Past Surgical History:   Procedure Laterality Date    BREAST SURGERY      ENLARGEMENT    COMBINED AUGMENTATION MAMMAPLASTY AND ABDOMINOPLASTY      with expander    CRANIOTOMY      right frontal lobe in 1999; A-V MALFORMATION REPAIR    ENDOMETRIAL ABLATION      NOVASURE; RESOLVED: [id number]    ENDOMETRIAL BIOPSY  03/20/2017    EB; WITHOUT CERVICAL DILATION    HYSTEROSCOPY W/ ENDOMETRIAL ABLATION      LAMINECTOMY      L4-L5; LUMBAR       Meds/Allergies:    Prior to Admission medications    Medication Sig Start Date End Date Taking?  Authorizing Provider   QUEtiapine (SEROquel) 100 mg tablet Take 50 mg by mouth 8/6/20 8/6/21 Yes Historical Provider, MD   venlafaxine (EFFEXOR-XR) 37 5 mg 24 hr capsule Take 187 5 mg by mouth daily 8/6/20  Yes Historical Provider, MD   zonisamide (ZONEGRAN) 100 mg capsule Take 100 mg by mouth 2 (two) times a day 9/8/20  Yes Historical Provider, MD     I have reviewed home medications with patient personally  Allergies: Allergies   Allergen Reactions    Augmentin [Amoxicillin-Pot Clavulanate]     Dilantin [Phenytoin Sodium Extended]     Sulfa Antibiotics     Tegretol [Carbamazepine]        Social History:     Marital Status: /Civil Union   Occupation: works from home  Patient Pre-hospital Living Situation: lives with family  Patient Pre-hospital Level of Mobility: fully  Patient Pre-hospital Diet Restrictions: none  Substance Use History:   Social History     Substance and Sexual Activity   Alcohol Use Yes    Comment: DRINKS BEER AS PER ALL SCRIPTS ; SOCIAL      Social History     Tobacco Use   Smoking Status Current Every Day Smoker    Packs/day: 0 50   Smokeless Tobacco Never Used     Social History     Substance and Sexual Activity   Drug Use Yes    Types: Marijuana    Comment: medical       Family History:    Family History   Problem Relation Age of Onset    Arthritis Mother     Carpal tunnel syndrome Mother     Obesity Mother     Hyperlipidemia Father     Hypertension Father        Physical Exam:     Vitals:   Blood Pressure: 109/58 (09/16/20 0418)  Pulse: 67 (09/16/20 0418)  Temperature: 98 1 °F (36 7 °C) (09/15/20 1856)  Temp Source: Oral (09/15/20 1856)  Respirations: 18 (09/16/20 0418)  Height: 5' 2" (157 5 cm) (09/15/20 1856)  Weight - Scale: 79 4 kg (175 lb) (09/15/20 1856)  SpO2: 95 % (09/16/20 0418)    Physical Exam  Constitutional:       General: She is not in acute distress  Appearance: She is not ill-appearing, toxic-appearing or diaphoretic  HENT:      Head: Normocephalic  Eyes:      General: No scleral icterus  Right eye: No discharge           Left eye: No discharge  Neck:      Musculoskeletal: No neck rigidity or muscular tenderness  Cardiovascular:      Rate and Rhythm: Normal rate  Heart sounds: No murmur  No friction rub  No gallop  Pulmonary:      Effort: Pulmonary effort is normal  No respiratory distress  Breath sounds: No stridor  No wheezing or rhonchi  Chest:      Chest wall: No tenderness  Abdominal:      Palpations: There is no mass  Tenderness: There is abdominal tenderness  There is no right CVA tenderness, left CVA tenderness or guarding  Hernia: No hernia is present  Musculoskeletal:         General: No swelling, tenderness, deformity or signs of injury  Right lower leg: No edema  Left lower leg: No edema  Lymphadenopathy:      Cervical: No cervical adenopathy  Skin:     Capillary Refill: Capillary refill takes less than 2 seconds  Coloration: Skin is pale  Skin is not jaundiced  Findings: No lesion  Neurological:      General: No focal deficit present  Cranial Nerves: No cranial nerve deficit  Sensory: No sensory deficit  Motor: No weakness  Coordination: Coordination normal       Gait: Gait normal       Deep Tendon Reflexes: Reflexes normal    Psychiatric:         Mood and Affect: Mood normal              Additional Data:     Lab Results: I have personally reviewed pertinent reports        Results from last 7 days   Lab Units 09/15/20  2030   WBC Thousand/uL 14 91*   HEMOGLOBIN g/dL 14 4   HEMATOCRIT % 43 3   PLATELETS Thousands/uL 273   NEUTROS PCT % 71   LYMPHS PCT % 20   MONOS PCT % 7   EOS PCT % 1     Results from last 7 days   Lab Units 09/15/20  2030   SODIUM mmol/L 137   POTASSIUM mmol/L 3 5   CHLORIDE mmol/L 107   CO2 mmol/L 24   BUN mg/dL 11   CREATININE mg/dL 0 75   ANION GAP mmol/L 6   CALCIUM mg/dL 9 0   ALBUMIN g/dL 3 9   TOTAL BILIRUBIN mg/dL 0 28   ALK PHOS U/L 92   ALT U/L 18   AST U/L 7   GLUCOSE RANDOM mg/dL 94                       Imaging: I have personally reviewed pertinent reports  CTA dissection protocol chest abdomen pelvis w wo contrast   Final Result by Maryan Salomon MD (09/16 0003)      1  No aortic dissection or aneurysm  2   5 4 cm simple appearing cyst in the spleen  Workstation performed: [id number]             EKG, Pathology, and Other Studies Reviewed on Admission:   · EKG: sinus rhythm with TWI  No old to compare  Allscripts / Epic Records Reviewed: Yes     ** Please Note: This note has been constructed using a voice recognition system   **

## 2020-09-16 NOTE — DISCHARGE SUMMARY
Discharge- Mervin Matos 1980, 41 y o  female MRN: [id number]  Unit/Bed#: ED 14 Encounter: [id number]  Primary Care Provider: Yakelin Lazaro DO   Date and time admitted to hospital: 9/15/2020  7:45 PM    * Abdominal pain  Assessment & Plan  · Left upper quadrant abdominal pain radiating to left shoulder; likely 2/2 splenic cyst   · EKG: NSR with occasional PVCs and TWI in V1; no prior for comparison   · Troponin negative x 3  · Telemetry review unremarkable   · Management as per [facility] surgery   · Analgesics as needed; currently rates pain as 6/10  · Trial Norco as patient with reported intolerance to Percocet     Splenic cyst  Assessment & Plan  · Evaluated by Surgery, apprecaite recommendations  · Outpatient follow up with possible elective intervention     Leukocytosis  Assessment & Plan  · Unclear etiology;  · No other SRIS criteria and without s/s of infection   · Monitor off antibiotics     Discharging Physician / Practitioner: Min Gutierrez PA-C  PCP: Yakelin Lazaro DO  Admission Date:   Admission Orders (From admission, onward)     Ordered        09/16/20 0308  Place in Observation  Once                   Discharge Date: 09/16/20    Resolved Problems  Date Reviewed: 9/16/2020    None          Consultations During Hospital Stay:  · Surgical oncology    Procedures Performed:   · None    Significant Findings / Test Results:   · CTA chest abdomen and pelvis 09/15/2020:   1   No aortic dissection or aneurysm  2   5 4 cm simple appearing cyst in the spleen  · Troponin negative x3  · EKG with T-wave inversions in V1  · WBC 14 91  · UA unremarkable    Incidental Findings:   · None     Test Results Pending at Discharge (will require follow up):    · None     Outpatient Tests Requested:  · None    Complications:  None    Reason for Admission:  Abdominal pain    Hospital Course:     Mervin Matos is a 41 y o  female patient who originally presented to the hospital on 9/15/2020 due to abdominal pain radiating to her back and left shoulder  ED workup consisted of CT a dissection study which was negative for aortic dissection or aneurysm  Given nature of her pain, patient was monitored on telemetry and troponins trended x3 all of which unrevealing  Patient denied chest pain, shortness of breath, urinary symptoms, nausea/vomiting/diarrhea prior to and throughout admission  CT study noted splenic cyst, which at this time is suspected etiology for patient's symptoms  She was evaluated by Surgical Oncology who recommended outpatient follow-up for further management  Patient was sent with prescription for Norco for pain control in outpatient setting  Patient expressed understanding and was in agreement with plan of care  She was advised to follow-up with her PCP as soon as possible  Please see above list of diagnoses and related plan for additional information  Condition at Discharge: fair     Discharge Day Visit / Exam:     Subjective:  Patient complains of sharp left upper quadrant abdominal pain that radiates across to her left mid back  Also, with associated L shoulder pain  Abdominal pain is worse with any movement  Denies chest pain, SOB, diarrhea, constipation or urinary symptoms  States she gets "jittery" with Percocet and willing to try Norco instead  Vitals: Blood Pressure: 118/60 (09/16/20 0900)  Pulse: 64 (09/16/20 0900)  Temperature: 98 1 °F (36 7 °C) (09/15/20 1856)  Temp Source: Oral (09/15/20 1856)  Respirations: 16 (09/16/20 0900)  Height: 5' 2" (157 5 cm) (09/15/20 1856)  Weight - Scale: 79 4 kg (175 lb) (09/15/20 1856)  SpO2: 96 % (09/16/20 0900)     Exam:   Physical Exam  Vitals signs and nursing note reviewed  Exam conducted with a chaperone present  Constitutional:       General: She is not in acute distress  Cardiovascular:      Rate and Rhythm: Normal rate and regular rhythm  Heart sounds: No murmur  Pulmonary:      Effort: Pulmonary effort is normal  No respiratory distress  Breath sounds: Normal breath sounds  No wheezing or rales  Abdominal:      General: Abdomen is flat  Bowel sounds are normal  There is no distension  Palpations: Abdomen is soft  Tenderness: There is abdominal tenderness (LUQ)  Comments: LUQ pain with palpation of RUQ   Neurological:      Mental Status: She is alert and oriented to person, place, and time  Discussion with Family: patient declined call to family     Discharge instructions/Information to patient and family:   See after visit summary for information provided to patient and family  Provisions for Follow-Up Care:  See after visit summary for information related to follow-up care and any pertinent home health orders  Disposition:     Home    For Discharges to [facility] SNF:   · Not Applicable to this Patient - Not Applicable to this Patient    Planned Readmission: no     Discharge Statement:  I spent 35 minutes discharging the patient  This time was spent on the day of discharge  I had direct contact with the patient on the day of discharge  Greater than 50% of the total time was spent examining patient, answering all patient questions, arranging and discussing plan of care with patient as well as directly providing post-discharge instructions  Additional time then spent on discharge activities  Discharge Medications:  See after visit summary for reconciled discharge medications provided to patient and family        ** Please Note: This note has been constructed using a voice recognition system **

## 2020-09-16 NOTE — ASSESSMENT & PLAN NOTE
· Unclear etiology;  · No other SRIS criteria and without s/s of infection   · Monitor off antibiotics

## 2020-09-16 NOTE — ED ATTENDING ATTESTATION
9/15/2020  Max Gordon DO, saw and evaluated the patient  I have discussed the patient with the resident/non-physician practitioner and agree with the resident's/non-physician practitioner's findings, Plan of Care, and MDM as documented in the resident's/non-physician practitioner's note, except where noted  All available labs and Radiology studies were reviewed  I was present for key portions of any procedure(s) performed by the resident/non-physician practitioner and I was immediately available to provide assistance  At this point I agree with the current assessment done in the Emergency Department  I have conducted an independent evaluation of this patient a history and physical is as follows:    64-year-old female presents emergency department with left side pain that originated in her upper chest and shoulder and then radiates downward to the side of her chest and is now in her left upper quadrant and left lower quadrant  She denies any shortness of breath or diaphoresis  Past Medical History:   Diagnosis Date    AVM (arteriovenous malformation)     Candidal vulvovaginitis     Depression     Headache     Normal delivery     2003 daughter, 2007 son    Ovarian cyst     RESOLVED: [id number]    Pelvic pain     Seizure (HCC)     Seizures (HCC)        /69 (BP Location: Right arm)   Pulse 63   Temp 98 1 °F (36 7 °C) (Oral)   Resp 16   Ht 5' 2" (1 575 m)   Wt 79 4 kg (175 lb)   SpO2 95%   BMI 32 01 kg/m²   No acute distress, alert and oriented x4, regular rate rhythm, lungs clear, mild tenderness to palpation left lateral lower ribs, abdomen soft nontender, extremities nontender with normal range of motion and no swelling  EKG normal   D-dimer low risk and PERC 0  Heart score 0  CTA of chest abdomen pelvis for dissection study ordered        11:44 PM  CT scan reviewed by me and shows large splenic cyst versus other abnormality, likely accounting for the pain pattern described  Official read is pending    Care signed out to the night team      ED Course         Critical Care Time  Procedures

## 2020-09-16 NOTE — PROGRESS NOTES
Progress Note - Alice Mcqueen 1980, 41 y o  female MRN: [id number]  Unit/Bed#: ED 14 Encounter: [id number]  Primary Care Provider: Debi Davidson DO   Date and time admitted to hospital: 9/15/2020  7:45 PM    * Abdominal pain  Assessment & Plan  · Left upper quadrant abdominal pain radiating to left shoulder; likely 2/2 splenic cyst   · EKG: NSR with occasional PVCs and TWI in V1; no prior for comparison   · Troponin negative x 3  · Telemetry review unremarkable   · Management as per [facility] surgery   · Analgesics as needed; currently rates pain as 6/10  · Trial Norco as patient with reported intolerance to Percocet     Splenic cyst  Assessment & Plan  · Evaluated by Surgery, apprecaite recommendations  · Outpatient follow up with possible elective intervention     Leukocytosis  Assessment & Plan  · Unclear etiology;  · No other SRIS criteria and without s/s of infection   · Monitor off antibiotics     Post Admission Follow-up    Subjective:   Patient complains of sharp left upper quadrant abdominal pain that radiates across to her left mid back  Also, with associated L shoulder pain  Abdominal pain is worse with any movement  Denies chest pain, SOB, diarrhea, constipation or urinary symptoms  States she gets "jittery" with Percocet and willing to try Norco instead  Objective:     Vitals:   Temp (24hrs), Av 1 °F (36 7 °C), Min:98 1 °F (36 7 °C), Max:98 1 °F (36 7 °C)    Temp:  [98 1 °F (36 7 °C)] 98 1 °F (36 7 °C)  HR:  [58-76] 64  Resp:  [16-18] 16  BP: (109-136)/(56-84) 118/60  SpO2:  [95 %-98 %] 96 %  Body mass index is 32 01 kg/m²  Physical Exam:     Physical Exam  Vitals signs and nursing note reviewed  Constitutional:       General: She is not in acute distress  Cardiovascular:      Rate and Rhythm: Normal rate and regular rhythm  Pulses: Normal pulses  Heart sounds: No murmur  Pulmonary:      Effort: Pulmonary effort is normal  No respiratory distress        Breath sounds: Normal breath sounds  No wheezing or rales  Abdominal:      General: Abdomen is flat  Bowel sounds are normal  There is no distension  Palpations: Abdomen is soft  There is no mass  Tenderness: There is abdominal tenderness (LUQ)  There is no right CVA tenderness, left CVA tenderness or guarding  Skin:     General: Skin is warm and dry  Coloration: Skin is not pale  Findings: No erythema  Neurological:      General: No focal deficit present  Mental Status: She is alert and oriented to person, place, and time  Additional Data:     Labs:    Results from last 7 days   Lab Units 09/15/20  2030   WBC Thousand/uL 14 91*   HEMOGLOBIN g/dL 14 4   HEMATOCRIT % 43 3   PLATELETS Thousands/uL 273   NEUTROS PCT % 71   LYMPHS PCT % 20   MONOS PCT % 7   EOS PCT % 1     Results from last 7 days   Lab Units 09/15/20  2030   SODIUM mmol/L 137   POTASSIUM mmol/L 3 5   CHLORIDE mmol/L 107   CO2 mmol/L 24   BUN mg/dL 11   CREATININE mg/dL 0 75   ANION GAP mmol/L 6   CALCIUM mg/dL 9 0   ALBUMIN g/dL 3 9   TOTAL BILIRUBIN mg/dL 0 28   ALK PHOS U/L 92   ALT U/L 18   AST U/L 7   GLUCOSE RANDOM mg/dL 94                         * I Have Reviewed All Lab Data Listed Above  * Additional Pertinent Lab Tests Reviewed:  [facility] 64 Admission Reviewed    Imaging:    Imaging Reports Reviewed Today Include: CTA chest, abdomen, pelvis   Imaging Personally Reviewed by Myself Includes:  none    Recent Cultures (last 7 days):           Last 24 Hours Medication List:   Current Facility-Administered Medications   Medication Dose Route Frequency Provider Last Rate    HYDROcodone-acetaminophen  1 tablet Oral Q6H PRN Trevor Dao PA-C      HYDROcodone-acetaminophen  2 tablet Oral Q6H PRN Kailey Dao PA-C      morphine injection  2 mg Intravenous Q4H PRN Adrian Bertrand MD      nicotine  1 patch Transdermal Daily Adrian Bertrand MD      QUEtiapine  50 mg Oral HS Adrian Bertrand MD      venlafaxine 187 5 mg Oral Daily Vern Frye MD      zonisamide  100 mg Oral BID Vern Frye MD          Today, Patient Was Seen By: Naty London PA-C    ** Please Note: Dictation voice to text software may have been used in the creation of this document   **

## 2020-09-16 NOTE — ASSESSMENT & PLAN NOTE
· Left upper quadrant abdominal pain radiating to left shoulder; likely 2/2 splenic cyst   · EKG: NSR with occasional PVCs and TWI in V1; no prior for comparison   · Troponin negative x 3  · Telemetry review unremarkable   · Management as per [facility] surgery   · Analgesics as needed; currently rates pain as 6/10  · Trial Norco as patient with reported intolerance to Percocet

## 2020-09-16 NOTE — DISCHARGE INSTR - AVS FIRST PAGE
Take 1-2 tablets of Norco every 6 hours as needed for moderate to severe pain  May alternate with ibuprofen if needed      Surgical office should call you to set up follow-up appointment

## 2020-09-16 NOTE — ASSESSMENT & PLAN NOTE
Left upper quadrant abdominal pain radiating to left shoulder  Placed on telemetry and trend troponin  If no ischemia or arrhythmia or bump in troponin can discontinue tele  Likely secondary to splenic cyst  Management as per white SIRS

## 2020-09-16 NOTE — CONSULTS
Consultation - Surgical Oncology   Jimena Rodriguez 41 y o  female MRN: [id number]  Unit/Bed#: ED 15 Encounter: [id number]     Assessment:  42-yr old female with a one-day history of LUQ pain with a CTAP finding of 5 cm splenic cyst  She is afebrile with a white count of [address]  Plan:  - admit to medicine service  - PRN analgesia  - surgical oncology service to follow  - intervention likely to be done electively  History of Present Illness   Physician Requesting Consult: Addison Calderon DO  Reason for Consult / Principal Problem: symptomatic splenic syst  HPI: Jimena Rodriguez is a 41y o  year old female who presents with a day history of LUQ pain  Pain severity score put at 6/10 radiating to the left back and the left shoulder  No history of fever, early satiety, nausea or vomiting  No previous hx of similar pain  She denies any abdominal swelling  No history of recent travel  CTAP done in the ED showed a 5 4 x 5 3 x 4 6 cm simple splenic cyst  ED admission white counts: 14 91  She had a craniotomy done for an AVM in 1999 and a microdiscectomy done in 2016  Known patient with seizure disorder well controlled on medications  Inpatient Consult to Surgical Oncology     Performed by  Di Yanes MD     Authorized by Di Yanes MD              Review of Systems   Constitutional: Negative  HENT: Negative  Eyes: Negative  Respiratory: Negative  Cardiovascular: Negative  Gastrointestinal: Positive for abdominal pain  Negative for abdominal distention, constipation, nausea and vomiting  Endocrine: Negative  Genitourinary: Negative  Musculoskeletal: Negative  Neurological: Negative  Psychiatric/Behavioral: Negative          Historical Information   Past Medical History:   Diagnosis Date    AVM (arteriovenous malformation)     Candidal vulvovaginitis     Depression     Headache     Normal delivery     2003 daughter, 2007 son    Ovarian cyst     RESOLVED: [id number]  Pelvic pain     [facility] [address]     Seizures ([facility] [address]      Past Surgical History:   Procedure Laterality Date    BREAST SURGERY      ENLARGEMENT    COMBINED AUGMENTATION MAMMAPLASTY AND ABDOMINOPLASTY      with expander    CRANIOTOMY      right frontal lobe in 1999; A-V MALFORMATION REPAIR    ENDOMETRIAL ABLATION      Doloris Goldmann; RESOLVED: [id number]    ENDOMETRIAL BIOPSY  03/20/2017    EB; WITHOUT CERVICAL DILATION    HYSTEROSCOPY W/ ENDOMETRIAL ABLATION      LAMINECTOMY      L4-L5; LUMBAR     Social History   Social History     Substance and Sexual Activity   Alcohol Use Yes    Comment: DRINKS BEER AS PER ALL SCRIPTS ; SOCIAL      Social History     Substance and Sexual Activity   Drug Use Yes    Types: Marijuana    Comment: medical     E-Cigarette/Vaping     E-Cigarette/Vaping Substances     Social History     Tobacco Use   Smoking Status Current Every Day Smoker    Packs/day: 0 50   Smokeless Tobacco Never Used     Family History   Problem Relation Age of Onset    Arthritis Mother     Carpal tunnel syndrome Mother     Obesity Mother     Hyperlipidemia Father     Hypertension Father        Meds/Allergies   all current active meds have been reviewed    Allergies   Allergen Reactions    Augmentin [Amoxicillin-Pot Clavulanate]     Dilantin [Phenytoin Sodium Extended]     Sulfa Antibiotics     Tegretol [Carbamazepine]        Objective   No intake or output data in the 24 hours ending 09/16/20 0137    Invasive Devices     Peripheral Intravenous Line            Peripheral IV 09/15/20 Left Antecubital less than 1 day                Physical Exam  Vitals signs reviewed  Constitutional:       General: She is not in acute distress  Appearance: She is not toxic-appearing or diaphoretic  HENT:      Head: Normocephalic  Mouth/Throat:      Mouth: Mucous membranes are moist    Eyes:      Extraocular Movements: Extraocular movements intact     Cardiovascular:      Rate and Rhythm: Normal rate and regular rhythm  Heart sounds: Normal heart sounds  Pulmonary:      Effort: Pulmonary effort is normal       Breath sounds: Normal breath sounds  Abdominal:      General: Bowel sounds are normal  There is no distension  Palpations: Abdomen is soft  There is no mass  Tenderness: There is abdominal tenderness in the left upper quadrant  Hernia: There is no hernia in the umbilical area, left inguinal area or right inguinal area  Skin:     General: Skin is warm and dry  Capillary Refill: Capillary refill takes less than 2 seconds  Neurological:      General: No focal deficit present  Mental Status: She is alert and oriented to person, place, and time  Psychiatric:         Mood and Affect: Mood normal          Lab Results: I have personally reviewed pertinent lab results  Imaging Studies: I have personally reviewed pertinent reports  Pathology, and Other Studies:     Code Status: No Order  Advance Directive and Living Will:      Power of :    POLST:      Counseling / Coordination of Care  Total floor / unit time spent today 30 minutes  Greater than 50% of total time was spent with the patient and / or family counseling and / or coordination of care   A description of the counseling / coordination of care: na

## 2020-09-16 NOTE — ED NOTES
Dr Benjie Lala from [facility] at bedside, pt to continue to be telemetry     Rahat [facility], [address] [facility]  09/16/20 8680

## 2020-09-16 NOTE — ASSESSMENT & PLAN NOTE
Will need to coordinate with general surgery regarding plan for splenectomy  Can be done inpatient or outpatient  Will depend on symptom control  Patient currently needing IV pain medications  Pain currently quantified as 5/10  The patient risk of infection capsulated bacteria following splenectomy

## 2020-09-16 NOTE — ED NOTES
Surgical resident at bedside     Colette Holcomb, [address] [place] [address]  09/16/20 6581

## 2020-09-16 NOTE — ED NOTES
Dr Natalie Muñoz at [facility], [address] [facility]  09/16/20 4280

## 2021-05-28 ENCOUNTER — OFFICE VISIT (OUTPATIENT)
Dept: OBGYN CLINIC | Facility: CLINIC | Age: 41
End: 2021-05-28
Payer: COMMERCIAL

## 2021-05-28 VITALS — SYSTOLIC BLOOD PRESSURE: 120 MMHG | BODY MASS INDEX: 32.74 KG/M2 | DIASTOLIC BLOOD PRESSURE: 76 MMHG | WEIGHT: 179 LBS

## 2021-05-28 DIAGNOSIS — Z12.31 ENCOUNTER FOR SCREENING MAMMOGRAM FOR MALIGNANT NEOPLASM OF BREAST: ICD-10-CM

## 2021-05-28 DIAGNOSIS — Z01.419 ENCOUNTER FOR GYNECOLOGICAL EXAMINATION (GENERAL) (ROUTINE) WITHOUT ABNORMAL FINDINGS: Primary | ICD-10-CM

## 2021-05-28 PROCEDURE — S0610 ANNUAL GYNECOLOGICAL EXAMINA: HCPCS | Performed by: STUDENT IN AN ORGANIZED HEALTH CARE EDUCATION/TRAINING PROGRAM

## 2021-05-28 PROCEDURE — 87624 HPV HI-RISK TYP POOLED RSLT: CPT | Performed by: STUDENT IN AN ORGANIZED HEALTH CARE EDUCATION/TRAINING PROGRAM

## 2021-05-28 PROCEDURE — G0145 SCR C/V CYTO,THINLAYER,RESCR: HCPCS | Performed by: STUDENT IN AN ORGANIZED HEALTH CARE EDUCATION/TRAINING PROGRAM

## 2021-05-28 NOTE — PROGRESS NOTES
Chivo Flynn  1980    Assessment and Plan:  Yearly exam without abnormality      -Pap collected today  We reviewed ASCCP guidelines for Pap testing today  -Mammo slip provided     RTO one year for yearly exam or sooner as needed  CC:  Yearly exam    S:  41 y o  female here for yearly exam        No LMP recorded  Patient has had an ablation  Contraception: none  Last Pap: 3/2017 NILM/HPV-  -has history of mild abnormal per patient, but denies ever needing colposcopy  Last Mammo: 2017 BIRADS2    Daily smoker, social drinker, medical marijuana  Exercises irregularly    Doing ok overall  Denies hot flushes, dyspareunia, abnormal uterine bleeding, urinary/fecal incontinence, changes in energy levels, mood  Her cycles are regular, not heavy or crampy  Sexual activity: She is sexually active without pain, bleeding or dryness  STD testing:  She does not want STD testing today       Family hx of breast/ovarian/colon cancer: denies      Current Outpatient Medications:     venlafaxine (EFFEXOR-XR) 37 5 mg 24 hr capsule, Take 187 5 mg by mouth daily, Disp: , Rfl:     QUEtiapine (SEROquel) 100 mg tablet, Take 50 mg by mouth, Disp: , Rfl:     zonisamide (ZONEGRAN) 100 mg capsule, Take 100 mg by mouth 2 (two) times a day, Disp: , Rfl:   Social History     Socioeconomic History    Marital status: /Civil Union     Spouse name: Not on file    Number of children: Not on file    Years of education: Not on file    Highest education level: Not on file   Occupational History    Not on file   Social Needs    Financial resource strain: Not on file    Food insecurity     Worry: Not on file     Inability: Not on file   [employer] needs     Medical: Not on file     Non-medical: Not on file   Tobacco Use    Smoking status: Current Every Day Smoker     Packs/day: 0 50    Smokeless tobacco: Never Used   Substance and Sexual Activity    Alcohol use: Yes     Comment: DRINKS BEER AS PER ALL SCRIPTS ; SOCIAL     Drug use: Yes     Types: Marijuana     Comment: medical    Sexual activity: Yes     Birth control/protection: Male Sterilization   Lifestyle    Physical activity     Days per week: Not on file     Minutes per session: Not on file    Stress: Not on file   Relationships    Social connections     Talks on phone: Not on file     Gets together: Not on file     Attends Protestant service: Not on file     Active member of club or organization: Not on file     Attends meetings of clubs or organizations: Not on file     Relationship status: Not on file    Intimate partner violence     Fear of current or ex partner: Not on file     Emotionally abused: Not on file     Physically abused: Not on file     Forced sexual activity: Not on file   Other Topics Concern    Not on file   Social History Narrative    Aerobic classes    Dance    Drinks coffee: 2 cups a day    Exercise: yoga     Family History   Problem Relation Age of Onset    Arthritis Mother     Carpal tunnel syndrome Mother     Obesity Mother     Hyperlipidemia Father     Hypertension Father       Past Medical History:   Diagnosis Date    AVM (arteriovenous malformation)     Candidal vulvovaginitis     Depression     Headache     Normal delivery     2003 daughter, 2007 son    Ovarian cyst     RESOLVED: [id number]    Pelvic pain     Seiz[address] ([address]     Seizures ([address]         Review of Systems   Respiratory: Negative  Cardiovascular: Negative  Gastrointestinal: Negative for constipation and diarrhea  Genitourinary: Negative for difficulty urinating, pelvic pain, vaginal bleeding, vaginal discharge, itching or odor  O:  Blood pressure 120/76, weight 81 2 kg (179 lb), not currently breastfeeding  Patient appears well and is not in distress  Neck is supple without masses  Breasts are symmetrical without mass, tenderness, nipple discharge, skin changes or adenopathy  Abdomen is soft and nontender without masses  External genitals are normal without lesions or rashes  Urethral meatus and urethra are normal  Bladder is normal to palpation  Vagina is normal without discharge or bleeding  Cervix is normal without discharge or lesion  Uterus is normal, mobile, nontender without palpable mass  Adnexa are normal, nontender, without palpable mass

## 2021-06-02 LAB
HPV HR 12 DNA CVX QL NAA+PROBE: NEGATIVE
HPV16 DNA CVX QL NAA+PROBE: NEGATIVE
HPV18 DNA CVX QL NAA+PROBE: NEGATIVE

## 2021-06-06 LAB
LAB AP GYN PRIMARY INTERPRETATION: NORMAL
Lab: NORMAL
PATH INTERP SPEC-IMP: NORMAL

## 2021-06-15 ENCOUNTER — TELEPHONE (OUTPATIENT)
Dept: OBGYN CLINIC | Facility: CLINIC | Age: 41
End: 2021-06-15

## 2021-06-15 NOTE — TELEPHONE ENCOUNTER
----- Message from Kaylie Valerio MD sent at 6/7/2021  8:25 AM EDT -----  Hi, could you please call Cady Santana to let her know that her result was normal, thank you!